# Patient Record
Sex: FEMALE | Race: BLACK OR AFRICAN AMERICAN | Employment: FULL TIME | URBAN - METROPOLITAN AREA
[De-identification: names, ages, dates, MRNs, and addresses within clinical notes are randomized per-mention and may not be internally consistent; named-entity substitution may affect disease eponyms.]

---

## 2017-01-31 ENCOUNTER — HOSPITAL ENCOUNTER (OUTPATIENT)
Dept: LAB | Age: 32
Discharge: HOME | End: 2017-01-31
Attending: OBSTETRICS & GYNECOLOGY
Payer: COMMERCIAL

## 2017-01-31 DIAGNOSIS — O03.9 SAB (SPONTANEOUS ABORTION): ICD-10-CM

## 2017-01-31 LAB — HCG SERPL-ACNC: 61 MIU/ML (ref 0–6)

## 2017-01-31 PROCEDURE — 84702 CHORIONIC GONADOTROPIN TEST: CPT | Performed by: NURSE PRACTITIONER

## 2017-01-31 PROCEDURE — 36415 COLL VENOUS BLD VENIPUNCTURE: CPT | Performed by: NURSE PRACTITIONER

## 2022-03-19 PROBLEM — O03.9 SAB (SPONTANEOUS ABORTION): Status: ACTIVE | Noted: 2017-01-31

## 2022-09-01 ENCOUNTER — HOSPITAL ENCOUNTER (EMERGENCY)
Age: 37
Discharge: HOME | End: 2022-09-01
Attending: EMERGENCY MEDICINE
Payer: COMMERCIAL

## 2022-09-01 ENCOUNTER — APPOINTMENT (OUTPATIENT)
Dept: CT IMAGING | Age: 37
End: 2022-09-01
Payer: COMMERCIAL

## 2022-09-01 VITALS
HEART RATE: 76 BPM | WEIGHT: 230 LBS | RESPIRATION RATE: 18 BRPM | BODY MASS INDEX: 38.32 KG/M2 | HEIGHT: 65 IN | DIASTOLIC BLOOD PRESSURE: 89 MMHG | TEMPERATURE: 97.6 F | SYSTOLIC BLOOD PRESSURE: 141 MMHG | OXYGEN SATURATION: 99 %

## 2022-09-01 DIAGNOSIS — R51.9 NONINTRACTABLE HEADACHE, UNSPECIFIED CHRONICITY PATTERN, UNSPECIFIED HEADACHE TYPE: Primary | ICD-10-CM

## 2022-09-01 PROCEDURE — 96372 THER/PROPH/DIAG INJ SC/IM: CPT

## 2022-09-01 PROCEDURE — 70450 CT HEAD/BRAIN W/O DYE: CPT

## 2022-09-01 PROCEDURE — 6360000002 HC RX W HCPCS: Performed by: EMERGENCY MEDICINE

## 2022-09-01 PROCEDURE — 99284 EMERGENCY DEPT VISIT MOD MDM: CPT

## 2022-09-01 RX ORDER — KETOROLAC TROMETHAMINE 30 MG/ML
30 INJECTION, SOLUTION INTRAMUSCULAR; INTRAVENOUS ONCE
Status: COMPLETED | OUTPATIENT
Start: 2022-09-01 | End: 2022-09-01

## 2022-09-01 RX ORDER — KETOROLAC TROMETHAMINE 10 MG/1
10 TABLET, FILM COATED ORAL EVERY 6 HOURS PRN
Qty: 20 TABLET | Refills: 0 | Status: SHIPPED | OUTPATIENT
Start: 2022-09-01

## 2022-09-01 RX ADMIN — KETOROLAC TROMETHAMINE 30 MG: 30 INJECTION, SOLUTION INTRAMUSCULAR; INTRAVENOUS at 08:55

## 2022-09-01 ASSESSMENT — ENCOUNTER SYMPTOMS
PHOTOPHOBIA: 0
EYE PAIN: 0
ABDOMINAL PAIN: 0

## 2022-09-01 ASSESSMENT — PAIN SCALES - GENERAL
PAINLEVEL_OUTOF10: 4
PAINLEVEL_OUTOF10: 9

## 2022-09-01 ASSESSMENT — PAIN SCALES - WONG BAKER: WONGBAKER_NUMERICALRESPONSE: 8

## 2022-09-01 NOTE — ED PROVIDER NOTES
[facility] Emergency Department Provider Note                   PCP:                No primary care provider on file. Age: 40 y.o. Sex: female     No diagnosis found. DISPOSITION          MDM  Number of Diagnoses or Management Options  Diagnosis management comments: Patient has had this off and on for 17 years. CT brain is negative. Patient has been instructed to follow-up with PCP. Amount and/or Complexity of Data Reviewed  Tests in the radiology section of CPT®: ordered and reviewed  Review and summarize past medical records: yes  Independent visualization of images, tracings, or specimens: yes    Risk of Complications, Morbidity, and/or Mortality  Presenting problems: moderate  Diagnostic procedures: moderate  Management options: moderate    Patient Progress  Patient progress: improved       Orders Placed This Encounter   Procedures    CT Head W/O Contrast        Medications   ketorolac (TORADOL) injection 30 mg (has no administration in time range)       New Prescriptions    No medications on file        Josué Yan is a 40 y.o. female who presents to the Emergency Department with chief complaint of    Chief Complaint   Patient presents with    Facial Pain     For  3 days  pt states she  sees a chiropractor for back pain. She had an adjustment yesterday      Patient is a 44-year-old female who has a history of chronic back pain who presents with left temporal headache intermittently for the last 3 days. Patient denies any trauma. Patient states for the last 17 years she has had the same headache and normally she has a chiropractic adjustment of her spine and the headache resolves but this time it has not. Patient denies any trauma and denies any fevers or chills and denies any cold cough congestion or shortness of breath or vomiting. The history is provided by the patient.    Headache  Pain location:  L temporal  Quality:  Sharp  Radiates to:  Does not radiate  Severity currently:  5/10  Onset quality:  Gradual  Duration:  3 days  Timing:  Intermittent  Progression:  Partially resolved  Chronicity:  Chronic  Similar to prior headaches: yes    Context: activity    Relieved by:  Nothing  Worsened by:  Nothing  Ineffective treatments:  None tried  Associated symptoms: no abdominal pain, no eye pain, no facial pain, no fatigue, no fever, no focal weakness, no hearing loss, no neck stiffness, no numbness, no paresthesias and no photophobia        Review of Systems   Constitutional:  Negative for fatigue and fever. HENT:  Negative for hearing loss. Eyes:  Negative for photophobia and pain. Gastrointestinal:  Negative for abdominal pain. Musculoskeletal:  Negative for neck stiffness. Neurological:  Positive for headaches. Negative for focal weakness, numbness and paresthesias. All other systems reviewed and are negative. No past medical history on file. No past surgical history on file. No family history on file. Social History     Socioeconomic History    Marital status: Single         Patient has no known allergies. Previous Medications    No medications on file        Vitals signs and nursing note reviewed. Patient Vitals for the past 4 hrs:   Temp Pulse Resp BP SpO2   09/01/22 0844 -- 67 18 (!) 150/96 99 %   09/01/22 0842 98.9 °F (37.2 °C) 72 -- (!) 150/96 --          Physical Exam  Vitals and nursing note reviewed. Constitutional:       Appearance: Normal appearance. HENT:      Head: Normocephalic and atraumatic. Nose: Nose normal.      Mouth/Throat:      Mouth: Mucous membranes are moist.      Pharynx: Oropharynx is clear. Eyes:      Extraocular Movements: Extraocular movements intact. Conjunctiva/sclera: Conjunctivae normal.      Pupils: Pupils are equal, round, and reactive to light. Cardiovascular:      Rate and Rhythm: Normal rate. Pulses: Normal pulses.    Pulmonary:      Effort: Pulmonary effort is normal.   Abdominal: General: Abdomen is flat. Bowel sounds are normal.      Palpations: Abdomen is soft. Musculoskeletal:         General: Normal range of motion. Cervical back: Normal range of motion and neck supple. Skin:     General: Skin is warm and dry. Capillary Refill: Capillary refill takes less than 2 seconds. Neurological:      General: No focal deficit present. Mental Status: She is alert and oriented to person, place, and time. Mental status is at baseline. Psychiatric:         Mood and Affect: Mood normal.         Behavior: Behavior normal.         Thought Content: Thought content normal.         Judgment: Judgment normal.        Procedures      Results Include:    No results found for this or any previous visit (from the past 24 hour(s)). CT Head W/O Contrast    (Results Pending)                          Voice dictation software was used during the making of this note. This software is not perfect and grammatical and other typographical errors may be present. This note has not been completely proofread for errors.       Jennifer Baez MD  09/01/22 8950

## 2022-09-01 NOTE — ED TRIAGE NOTES
First contact with pt. Pt states she has shooting pains to her face.  She is seeing a chirpractor for back issues and had an adjustment yesterday